# Patient Record
Sex: FEMALE | Race: WHITE | NOT HISPANIC OR LATINO | URBAN - NONMETROPOLITAN AREA
[De-identification: names, ages, dates, MRNs, and addresses within clinical notes are randomized per-mention and may not be internally consistent; named-entity substitution may affect disease eponyms.]

---

## 2017-11-13 ENCOUNTER — OFFICE VISIT (OUTPATIENT)
Dept: MEDICAL GROUP | Facility: PHYSICIAN GROUP | Age: 62
End: 2017-11-13
Payer: COMMERCIAL

## 2017-11-13 VITALS
DIASTOLIC BLOOD PRESSURE: 82 MMHG | HEART RATE: 89 BPM | RESPIRATION RATE: 16 BRPM | TEMPERATURE: 97.6 F | HEIGHT: 61 IN | WEIGHT: 160.7 LBS | SYSTOLIC BLOOD PRESSURE: 148 MMHG | BODY MASS INDEX: 30.34 KG/M2 | OXYGEN SATURATION: 95 %

## 2017-11-13 DIAGNOSIS — E78.2 MIXED HYPERLIPIDEMIA: ICD-10-CM

## 2017-11-13 DIAGNOSIS — E11.9 TYPE 2 DIABETES MELLITUS WITHOUT COMPLICATION, WITH LONG-TERM CURRENT USE OF INSULIN (HCC): ICD-10-CM

## 2017-11-13 DIAGNOSIS — I10 ESSENTIAL HYPERTENSION: ICD-10-CM

## 2017-11-13 DIAGNOSIS — E66.9 OBESITY (BMI 30-39.9): ICD-10-CM

## 2017-11-13 DIAGNOSIS — Z79.4 TYPE 2 DIABETES MELLITUS WITHOUT COMPLICATION, WITH LONG-TERM CURRENT USE OF INSULIN (HCC): ICD-10-CM

## 2017-11-13 PROCEDURE — 99204 OFFICE O/P NEW MOD 45 MIN: CPT | Performed by: NURSE PRACTITIONER

## 2017-11-13 RX ORDER — PRAVASTATIN SODIUM 80 MG/1
80 TABLET ORAL NIGHTLY
COMMUNITY
End: 2017-11-13 | Stop reason: SDUPTHER

## 2017-11-13 RX ORDER — IBUPROFEN 200 MG
TABLET ORAL
COMMUNITY
End: 2018-02-20 | Stop reason: SDUPTHER

## 2017-11-13 RX ORDER — PRAVASTATIN SODIUM 80 MG/1
80 TABLET ORAL DAILY
Qty: 90 TAB | Refills: 0 | Status: SHIPPED | OUTPATIENT
Start: 2017-11-13 | End: 2017-12-11 | Stop reason: SDUPTHER

## 2017-11-13 RX ORDER — ENALAPRIL MALEATE 10 MG/1
30 TABLET ORAL DAILY
Qty: 270 TAB | Refills: 0 | Status: SHIPPED | OUTPATIENT
Start: 2017-11-13 | End: 2017-12-11

## 2017-11-13 RX ORDER — ENALAPRIL MALEATE 20 MG/1
20 TABLET ORAL DAILY
COMMUNITY
End: 2017-11-13

## 2017-11-13 ASSESSMENT — PATIENT HEALTH QUESTIONNAIRE - PHQ9: CLINICAL INTERPRETATION OF PHQ2 SCORE: 0

## 2017-11-13 NOTE — PROGRESS NOTES
[facility]  Primary Care Office Visit - Problem-Oriented        History:     Magda Poole is a 62 y.o. female who is here today to discuss Diabetes      Type 2 diabetes mellitus without complication, with long-term current use of insulin (CMS-HCC)  Patient is 62-year-old female who is new to me and here to establish care. She does have type 2 diabetes mellitus diagnosed nearly 10 years ago. She continues on Lantus 10 units nightly, her fasting morning glucoses consistently in the 200s. She is also on metformin 1000 mg twice a day. She does admit to poor diet. She denies hypoglycemic episodes. She denies any regular exercise. She denies neuropathy or nephropathy. She is on ARB, blood pressure is uncontrolled. She is also on a statin. She is due for labs.     Essential hypertension  Patient is a 62-year-old female with hypertension and T2DM, she continues on enalapril 20 mg daily. She does not monitor at home. Her blood pressure is uncontrolled, repeat check in the office is 148/82. She denies chest pain, shortness of breath, change in vision, headaches. We will increase her enalapril to 30 g daily and asked that she follows up in 2 weeks for blood pressure check and labs. She'll follow-up with me in one month.     Mixed hyperlipidemia  Patient is a 62-year-old female who is new to me and here to establish care. She does have type 2 diabetes, hyperlipidemia, hypertension. She continues on pravastatin 80 mg daily. She admits to poor diet. She is due for labs.         Past Medical History:   Diagnosis Date   • Diabetes (CMS-HCC)    • Hyperlipidemia    • Hypertension      Past Surgical History:   Procedure Laterality Date   • ABDOMINAL HYSTERECTOMY TOTAL  2004    benign causes      Social History     Social History   • Marital status: Unknown     Spouse name: N/A   • Number of children: N/A   • Years of education: N/A     Occupational History   • Not on file.     Social History Main Topics   • Smoking  "status: Never Smoker   • Smokeless tobacco: Never Used   • Alcohol use No   • Drug use: No   • Sexual activity: Yes     Partners: Male      Comment:       Other Topics Concern   • Not on file     Social History Narrative   • No narrative on file     History   Smoking Status   • Never Smoker   Smokeless Tobacco   • Never Used     Family History   Problem Relation Age of Onset   • Diabetes Mother    • Heart Disease Father 80     No Known Allergies    Problem List:     Patient Active Problem List    Diagnosis Date Noted   • Obesity (BMI 30-39.9) 11/13/2017   • Type 2 diabetes mellitus without complication, with long-term current use of insulin (CMS-HCC) 11/13/2017   • Essential hypertension 11/13/2017   • Mixed hyperlipidemia 11/13/2017         Medications:     Current Outpatient Prescriptions:   •  INSULIN SYRINGE .5CC/29G (RELION INSULIN SYR 0.5CC/29G) 29G X 1/2\" 0.5 ML Misc, by Does not apply route., Disp: , Rfl:   •  insulin glargine (LANTUS) 100 UNIT/ML Solution Pen-injector injection, 10 units HS, increase nightly by 1 unit until fasting morning glucose is 130, Disp: 5 PEN, Rfl: 11  •  metformin (GLUCOPHAGE) 1000 MG tablet, Take 1 Tab by mouth 2 times a day, with meals., Disp: 180 Tab, Rfl: 0  •  pravastatin (PRAVACHOL) 80 MG tablet, Take 1 Tab by mouth every day., Disp: 90 Tab, Rfl: 0  •  enalapril (VASOTEC) 10 MG Tab, Take 3 Tabs by mouth every day., Disp: 270 Tab, Rfl: 0      Review of Systems:     Pertinent positives as per HPI, all other systems reviewed and WNL    Physical Assessment:     VS: /82   Pulse 89   Temp 36.4 °C (97.6 °F)   Resp 16   Ht 1.549 m (5' 1\")   Wt 72.9 kg (160 lb 11.2 oz)   SpO2 95%   Breastfeeding? No   BMI 30.36 kg/m²     General: Well-developed, well-nourished, female     Head: PERRL, EOMI. Normocephalic. No facial asymmetry noted.  Cardiovasc: RRR, no MRG. No thrills or bruits. Pulses 2+ and symmetric at all distal extremities.  Pulmonary: Lungs clear " bilaterally.  Normal respiratory effort. No wheeze or crackles.   Neuro: Alert and oriented, CNs II-XII intact, no focal deficits.  Skin:No rashes noted. Skin warm, dry, intact    Psych: Dressed appropriately for the weather, pleasant and conversant.  Affect, mood & judgment appropriate.    Assessment/Plan:   Magda was seen today for diabetes.    Diagnoses and all orders for this visit:    Obesity (BMI 30-39.9), uncontrolled   -     Patient identified as having weight management issue.  Appropriate orders and counseling given.    Type 2 diabetes mellitus without complication, with long-term current use of insulin (CMS-[facility]), unclear control   - Maximize Lantus by increasing by one unit nightly until fasting glucose is 130. Continue metformin as previously prescribed. On ARB + statin. Labs as ordered below, follow-up in 3 months.   -     HEMOGLOBIN A1C; Future  -     insulin glargine (LANTUS) 100 UNIT/ML Solution Pen-injector injection; 10 units HS, increase nightly by 1 unit until fasting morning glucose is 130  -     metformin (GLUCOPHAGE) 1000 MG tablet; Take 1 Tab by mouth 2 times a day, with meals.    Essential hypertension, uncontrolled   Increase enalapril to 30 g daily, blood pressure check in 2 weeks, follow-up for office visit in one month.   -     COMP METABOLIC PANEL; Future  -     enalapril (VASOTEC) 10 MG Tab; Take 3 Tabs by mouth every day.    Mixed hyperlipidemia, unclear control, due for labs  -     LIPID PROFILE; Future  -     pravastatin (PRAVACHOL) 80 MG tablet; Take 1 Tab by mouth every day.      Follow up in 1 month to review labs     Patient is agreeable to the above plan and voiced understanding. All questions answered.     Please note that this dictation was created using voice recognition software. I have made every reasonable attempt to correct obvious errors, but I expect that there are errors of grammar and possibly content that I did not discover before finalizing the note.      [place]  MELIA Easton  11/13/2017, 3:24 PM

## 2017-11-13 NOTE — ASSESSMENT & PLAN NOTE
Patient is a 62-year-old female with hypertension and T2DM, she continues on enalapril 20 mg daily. She does not monitor at home. Her blood pressure is uncontrolled, repeat check in the office is 148/82. She denies chest pain, shortness of breath, change in vision, headaches. We will increase her enalapril to 30 g daily and asked that she follows up in 2 weeks for blood pressure check and labs. She'll follow-up with me in one month.

## 2017-11-13 NOTE — ASSESSMENT & PLAN NOTE
Patient is 62-year-old female who is new to me and here to establish care. She does have type 2 diabetes mellitus diagnosed nearly 10 years ago. She continues on Lantus 10 units nightly, her fasting morning glucoses consistently in the 200s. She is also on metformin 1000 mg twice a day. She does admit to poor diet. She denies hypoglycemic episodes. She denies any regular exercise. She denies neuropathy or nephropathy. She is on ARB, blood pressure is uncontrolled. She is also on a statin. She is due for labs.

## 2017-11-13 NOTE — ASSESSMENT & PLAN NOTE
Patient is a 62-year-old female who is new to me and here to establish care. She does have type 2 diabetes, hyperlipidemia, hypertension. She continues on pravastatin 80 mg daily. She admits to poor diet. She is due for labs.

## 2017-11-20 ENCOUNTER — HOSPITAL ENCOUNTER (OUTPATIENT)
Dept: LAB | Facility: MEDICAL CENTER | Age: 62
End: 2017-11-20
Attending: NURSE PRACTITIONER
Payer: COMMERCIAL

## 2017-11-20 ENCOUNTER — NON-PROVIDER VISIT (OUTPATIENT)
Dept: MEDICAL GROUP | Facility: PHYSICIAN GROUP | Age: 62
End: 2017-11-20
Payer: COMMERCIAL

## 2017-11-20 VITALS — SYSTOLIC BLOOD PRESSURE: 142 MMHG | DIASTOLIC BLOOD PRESSURE: 80 MMHG

## 2017-11-20 DIAGNOSIS — E11.9 TYPE 2 DIABETES MELLITUS WITHOUT COMPLICATION, WITH LONG-TERM CURRENT USE OF INSULIN (HCC): ICD-10-CM

## 2017-11-20 DIAGNOSIS — Z79.4 TYPE 2 DIABETES MELLITUS WITHOUT COMPLICATION, WITH LONG-TERM CURRENT USE OF INSULIN (HCC): ICD-10-CM

## 2017-11-20 DIAGNOSIS — E78.2 MIXED HYPERLIPIDEMIA: ICD-10-CM

## 2017-11-20 DIAGNOSIS — I10 ESSENTIAL HYPERTENSION: ICD-10-CM

## 2017-11-20 LAB
ALBUMIN SERPL BCP-MCNC: 3.3 G/DL (ref 3.2–4.9)
ALBUMIN/GLOB SERPL: 0.8 G/DL
ALP SERPL-CCNC: 56 U/L (ref 30–99)
ALT SERPL-CCNC: 24 U/L (ref 2–50)
ANION GAP SERPL CALC-SCNC: 11 MMOL/L (ref 0–11.9)
AST SERPL-CCNC: 30 U/L (ref 12–45)
BILIRUB SERPL-MCNC: 0.8 MG/DL (ref 0.1–1.5)
BUN SERPL-MCNC: 12 MG/DL (ref 8–22)
CALCIUM SERPL-MCNC: 8.5 MG/DL (ref 8.5–10.5)
CHLORIDE SERPL-SCNC: 96 MMOL/L (ref 96–112)
CHOLEST SERPL-MCNC: 154 MG/DL (ref 100–199)
CO2 SERPL-SCNC: 18 MMOL/L (ref 20–33)
CREAT SERPL-MCNC: 0.61 MG/DL (ref 0.5–1.4)
EST. AVERAGE GLUCOSE BLD GHB EST-MCNC: 223 MG/DL
GFR SERPL CREATININE-BSD FRML MDRD: >60 ML/MIN/1.73 M 2
GLOBULIN SER CALC-MCNC: 4 G/DL (ref 1.9–3.5)
GLUCOSE SERPL-MCNC: 163 MG/DL (ref 65–99)
HBA1C MFR BLD: 9.4 % (ref 0–5.6)
HDLC SERPL-MCNC: 30 MG/DL
LDLC SERPL CALC-MCNC: 95 MG/DL
POTASSIUM SERPL-SCNC: 4.1 MMOL/L (ref 3.6–5.5)
PROT SERPL-MCNC: 7.3 G/DL (ref 6–8.2)
SODIUM SERPL-SCNC: 125 MMOL/L (ref 135–145)
TRIGL SERPL-MCNC: 147 MG/DL (ref 0–149)

## 2017-11-20 PROCEDURE — 83036 HEMOGLOBIN GLYCOSYLATED A1C: CPT

## 2017-11-20 PROCEDURE — 36415 COLL VENOUS BLD VENIPUNCTURE: CPT

## 2017-11-20 PROCEDURE — 80061 LIPID PANEL: CPT

## 2017-11-20 PROCEDURE — 80053 COMPREHEN METABOLIC PANEL: CPT

## 2017-11-20 NOTE — PROGRESS NOTES
Magda Poole is a 62 y.o. female here for a non-provider visit for Blood Pressure Check.    Vitals:    11/20/17 0804   BP: 142/80     If abnormal, was an in office provider notified today? Yes  Routed to PCP? Yes

## 2017-11-21 ENCOUNTER — TELEPHONE (OUTPATIENT)
Dept: MEDICAL GROUP | Facility: PHYSICIAN GROUP | Age: 62
End: 2017-11-21

## 2017-11-21 DIAGNOSIS — E87.1 HYPONATREMIA: ICD-10-CM

## 2017-11-21 DIAGNOSIS — E11.9 TYPE 2 DIABETES MELLITUS WITHOUT COMPLICATION, WITH LONG-TERM CURRENT USE OF INSULIN (HCC): ICD-10-CM

## 2017-11-21 DIAGNOSIS — Z79.4 TYPE 2 DIABETES MELLITUS WITHOUT COMPLICATION, WITH LONG-TERM CURRENT USE OF INSULIN (HCC): ICD-10-CM

## 2017-11-21 NOTE — TELEPHONE ENCOUNTER
Provider notes - Reviewed labs, called patient to discuss hyponatremia, we will move forward with additional laboratory evaluation. Patient is feeling well, she denies polyphasia, increased urination, swelling of the legs, diarrhea. She does admit that she has not been eating much, she doesn't drink more than 3 cups of coffee in the morning and 3 bottles of water throughout the day. She has uncontrolled diabetes, she is on metformin but she does deny diarrhea.     MA's please call patient - she is to eat salty foods, limit coffee, moderate water consumption, repeat labs in about 2 weeks. Start taking new diabetes medication with meformin in the morning, her diabetes may be causing her low sodium.    Follow up in 3 weeks to review labs, bring glucose readings to appointment.

## 2017-11-22 ENCOUNTER — TELEPHONE (OUTPATIENT)
Dept: MEDICAL GROUP | Facility: PHYSICIAN GROUP | Age: 62
End: 2017-11-22

## 2017-11-22 DIAGNOSIS — Z79.4 TYPE 2 DIABETES MELLITUS WITHOUT COMPLICATION, WITH LONG-TERM CURRENT USE OF INSULIN (HCC): ICD-10-CM

## 2017-11-22 DIAGNOSIS — E11.9 TYPE 2 DIABETES MELLITUS WITHOUT COMPLICATION, WITH LONG-TERM CURRENT USE OF INSULIN (HCC): ICD-10-CM

## 2017-11-22 RX ORDER — GLIPIZIDE 10 MG/1
10 TABLET ORAL 2 TIMES DAILY
Qty: 60 TAB | Refills: 2 | Status: SHIPPED | OUTPATIENT
Start: 2017-11-22 | End: 2017-12-11

## 2017-11-22 NOTE — TELEPHONE ENCOUNTER
Start glipizide 10mg with breakfast, must be taken with food as it can drop glucose.     Take once a day for a week, then increase to twice a day with meals

## 2017-11-22 NOTE — TELEPHONE ENCOUNTER
Pharmacy faxed over and stated that Magda cannot afford $323.43 for a 30 day supply of the Invokana. Is there other medication that she can take. Please advise?

## 2017-11-29 ENCOUNTER — TELEPHONE (OUTPATIENT)
Dept: MEDICAL GROUP | Facility: PHYSICIAN GROUP | Age: 62
End: 2017-11-29

## 2017-11-29 NOTE — TELEPHONE ENCOUNTER
1. Caller Name: Magda                      Call Back Number: [phone] (home)       2. Message: Patient called and stated that since she starting taking the Glipizide on Saturday she has been very itchy. She is still taking the medication. Please advise?    3. Patient approves office to leave a detailed voicemail/MyChart message: N\A

## 2017-11-29 NOTE — TELEPHONE ENCOUNTER
This can be a side effect of the medication, stop x 1 week if sx resolve this is like due to the medication and we can try an alternate. She needs to call us next week and let us know.

## 2017-12-06 ENCOUNTER — HOSPITAL ENCOUNTER (OUTPATIENT)
Dept: LAB | Facility: MEDICAL CENTER | Age: 62
End: 2017-12-06
Attending: NURSE PRACTITIONER
Payer: COMMERCIAL

## 2017-12-06 DIAGNOSIS — E87.1 HYPONATREMIA: ICD-10-CM

## 2017-12-06 LAB
ANION GAP SERPL CALC-SCNC: 10 MMOL/L (ref 0–11.9)
BUN SERPL-MCNC: 10 MG/DL (ref 8–22)
CALCIUM SERPL-MCNC: 9.6 MG/DL (ref 8.5–10.5)
CHLORIDE SERPL-SCNC: 106 MMOL/L (ref 96–112)
CO2 SERPL-SCNC: 22 MMOL/L (ref 20–33)
CORTIS SERPL-MCNC: 19.6 UG/DL (ref 0–23)
CREAT SERPL-MCNC: 0.68 MG/DL (ref 0.5–1.4)
GFR SERPL CREATININE-BSD FRML MDRD: >60 ML/MIN/1.73 M 2
GLUCOSE SERPL-MCNC: 141 MG/DL (ref 65–99)
POTASSIUM SERPL-SCNC: 4.2 MMOL/L (ref 3.6–5.5)
SODIUM SERPL-SCNC: 138 MMOL/L (ref 135–145)
T4 FREE SERPL-MCNC: 0.75 NG/DL (ref 0.53–1.43)
TSH SERPL DL<=0.005 MIU/L-ACNC: 3.91 UIU/ML (ref 0.3–3.7)

## 2017-12-06 PROCEDURE — 82533 TOTAL CORTISOL: CPT

## 2017-12-06 PROCEDURE — 84439 ASSAY OF FREE THYROXINE: CPT

## 2017-12-06 PROCEDURE — 36415 COLL VENOUS BLD VENIPUNCTURE: CPT

## 2017-12-06 PROCEDURE — 84443 ASSAY THYROID STIM HORMONE: CPT

## 2017-12-06 PROCEDURE — 80048 BASIC METABOLIC PNL TOTAL CA: CPT

## 2017-12-06 PROCEDURE — 82024 ASSAY OF ACTH: CPT

## 2017-12-08 LAB — ACTH PLAS-MCNC: 30 PG/ML (ref 6–58)

## 2017-12-11 ENCOUNTER — OFFICE VISIT (OUTPATIENT)
Dept: MEDICAL GROUP | Facility: PHYSICIAN GROUP | Age: 62
End: 2017-12-11
Payer: COMMERCIAL

## 2017-12-11 VITALS
DIASTOLIC BLOOD PRESSURE: 78 MMHG | HEIGHT: 61 IN | TEMPERATURE: 98.2 F | OXYGEN SATURATION: 95 % | BODY MASS INDEX: 27.94 KG/M2 | WEIGHT: 148 LBS | SYSTOLIC BLOOD PRESSURE: 150 MMHG | HEART RATE: 90 BPM | RESPIRATION RATE: 16 BRPM

## 2017-12-11 DIAGNOSIS — Z79.4 TYPE 2 DIABETES MELLITUS WITHOUT COMPLICATION, WITH LONG-TERM CURRENT USE OF INSULIN (HCC): ICD-10-CM

## 2017-12-11 DIAGNOSIS — E78.2 MIXED HYPERLIPIDEMIA: ICD-10-CM

## 2017-12-11 DIAGNOSIS — E11.9 TYPE 2 DIABETES MELLITUS WITHOUT COMPLICATION, WITH LONG-TERM CURRENT USE OF INSULIN (HCC): ICD-10-CM

## 2017-12-11 DIAGNOSIS — Z00.00 HEALTH CARE MAINTENANCE: ICD-10-CM

## 2017-12-11 DIAGNOSIS — I10 ESSENTIAL HYPERTENSION: ICD-10-CM

## 2017-12-11 DIAGNOSIS — Z12.31 ENCOUNTER FOR SCREENING MAMMOGRAM FOR BREAST CANCER: ICD-10-CM

## 2017-12-11 DIAGNOSIS — Z23 NEED FOR VACCINATION: ICD-10-CM

## 2017-12-11 DIAGNOSIS — E87.1 HYPONATREMIA: ICD-10-CM

## 2017-12-11 DIAGNOSIS — R79.89 ELEVATED TSH: ICD-10-CM

## 2017-12-11 PROCEDURE — 90715 TDAP VACCINE 7 YRS/> IM: CPT | Performed by: NURSE PRACTITIONER

## 2017-12-11 PROCEDURE — 90472 IMMUNIZATION ADMIN EACH ADD: CPT | Performed by: NURSE PRACTITIONER

## 2017-12-11 PROCEDURE — 99214 OFFICE O/P EST MOD 30 MIN: CPT | Mod: 25 | Performed by: NURSE PRACTITIONER

## 2017-12-11 PROCEDURE — 90686 IIV4 VACC NO PRSV 0.5 ML IM: CPT | Performed by: NURSE PRACTITIONER

## 2017-12-11 PROCEDURE — 90471 IMMUNIZATION ADMIN: CPT | Performed by: NURSE PRACTITIONER

## 2017-12-11 RX ORDER — ENALAPRIL MALEATE 20 MG/1
40 TABLET ORAL DAILY
Qty: 180 TAB | Refills: 0 | Status: SHIPPED | OUTPATIENT
Start: 2017-12-11 | End: 2018-03-12 | Stop reason: SDUPTHER

## 2017-12-11 RX ORDER — AMLODIPINE BESYLATE 5 MG/1
5 TABLET ORAL DAILY
Qty: 90 TAB | Refills: 0 | Status: SHIPPED | OUTPATIENT
Start: 2017-12-11 | End: 2018-03-12 | Stop reason: SDUPTHER

## 2017-12-11 RX ORDER — PRAVASTATIN SODIUM 80 MG/1
80 TABLET ORAL DAILY
Qty: 90 TAB | Refills: 0 | Status: SHIPPED | OUTPATIENT
Start: 2017-12-11 | End: 2018-03-12 | Stop reason: SDUPTHER

## 2017-12-11 NOTE — ASSESSMENT & PLAN NOTE
Colonoscopy March 2017, adenomatous polyp, recall 5 years, I will attempt to obtain these records.  Due for mammogram, ordered today  Vaccines today as ordered.  Recommend Pap smear

## 2017-12-11 NOTE — ASSESSMENT & PLAN NOTE
Very mild elevation in TSH, patient denies any symptoms of hypothyroidism, we will continue to monitor this annually.

## 2017-12-11 NOTE — ASSESSMENT & PLAN NOTE
Patient is a 62-year-old female with type 2 diabetes here for follow-up. Her most recent A1c is elevated at 9.4%. She does continue on Lantus which she has recently titrated up to 15 units nightly, fasting glucose readings are trending down nicely, averaging about 135. She continues on metformin 1000 mg twice daily. Unfortunately she was unable to tolerate glipizide due to itching. Invokana was too costly. We will start her on Januvia 100 mg daily and asked that she follow up in 3 months with repeat A1c. She is on ARB, statin. She is due for retinal screening and monofilament.

## 2017-12-11 NOTE — ASSESSMENT & PLAN NOTE
Patient is a 62-year-old female with type 2 diabetes and hypertension, she is here for follow-up. Her blood pressure remains uncontrolled on enalapril 30 mg daily. We will maximize this to 40 mg daily and add amlodipine 5 mg daily, she'll follow up in 2 weeks for blood pressure check. She denies chest pain, shortness of breath, change of vision, headaches.

## 2017-12-11 NOTE — ASSESSMENT & PLAN NOTE
Patient is a 62-year-old female with type 2 diabetes and hyperlipidemia, she discontinued statin, we did repeat labs today, total cholesterol and LDL are well controlled on pravastatin 80 mg daily. We will monitor this every 6-12 months.

## 2017-12-11 NOTE — PROGRESS NOTES
[facility]  Primary Care Office Visit - Problem-Oriented        History:     Magda Poole is a 62 y.o. female who is here today to discuss Hypertension      Essential hypertension  Patient is a 62-year-old female with type 2 diabetes and hypertension, she is here for follow-up. Her blood pressure remains uncontrolled on enalapril 30 mg daily. We will maximize this to 40 mg daily and add amlodipine 5 mg daily, she'll follow up in 2 weeks for blood pressure check. She denies chest pain, shortness of breath, change of vision, headaches.    Type 2 diabetes mellitus without complication, with long-term current use of insulin (CMS-HCC)  Patient is a 62-year-old female with type 2 diabetes here for follow-up. Her most recent A1c is elevated at 9.4%. She does continue on Lantus which she has recently titrated up to 15 units nightly, fasting glucose readings are trending down nicely, averaging about 135. She continues on metformin 1000 mg twice daily. Unfortunately she was unable to tolerate glipizide due to itching. Invokana was too costly. We will start her on Januvia 100 mg daily and asked that she follow up in 3 months with repeat A1c. She is on ARB, statin. She is due for retinal screening and monofilament.             Health care maintenance  Colonoscopy March 2017, adenomatous polyp, recall 5 years, I will attempt to obtain these records.  Due for mammogram, ordered today  Vaccines today as ordered.  Recommend Pap smear         Mixed hyperlipidemia  Patient is a 62-year-old female with type 2 diabetes and hyperlipidemia, she discontinued statin, we did repeat labs today, total cholesterol and LDL are well controlled on pravastatin 80 mg daily. We will monitor this every 6-12 months.    Elevated TSH  Very mild elevation in TSH, patient denies any symptoms of hypothyroidism, we will continue to monitor this annually.        Past Medical History:   Diagnosis Date   • Diabetes (CMS-HCC)    •  "Hyperlipidemia    • Hypertension      Past Surgical History:   Procedure Laterality Date   • ABDOMINAL HYSTERECTOMY TOTAL  2004    benign causes      Social History     Social History   • Marital status: Unknown     Spouse name: N/A   • Number of children: N/A   • Years of education: N/A     Occupational History   • Not on file.     Social History Main Topics   • Smoking status: Never Smoker   • Smokeless tobacco: Never Used   • Alcohol use No   • Drug use: No   • Sexual activity: Yes     Partners: Male      Comment:       Other Topics Concern   • Not on file     Social History Narrative   • No narrative on file     History   Smoking Status   • Never Smoker   Smokeless Tobacco   • Never Used     Family History   Problem Relation Age of Onset   • Diabetes Mother    • Heart Disease Father 80     Allergies   Allergen Reactions   • Glipizide Itching       Problem List:     Patient Active Problem List    Diagnosis Date Noted   • Health care maintenance 12/11/2017   • Obesity (BMI 30-39.9) 11/13/2017   • Type 2 diabetes mellitus without complication, with long-term current use of insulin (CMS-HCC) 11/13/2017   • Essential hypertension 11/13/2017   • Mixed hyperlipidemia 11/13/2017         Medications:     Current Outpatient Prescriptions:   •  sitagliptin (JANUVIA) 100 MG Tab, Take 1 Tab by mouth every day., Disp: 90 Tab, Rfl: 0  •  metformin (GLUCOPHAGE) 1000 MG tablet, Take 1 Tab by mouth 2 times a day, with meals., Disp: 180 Tab, Rfl: 0  •  pravastatin (PRAVACHOL) 80 MG tablet, Take 1 Tab by mouth every day., Disp: 90 Tab, Rfl: 0  •  enalapril (VASOTEC) 20 MG tablet, Take 2 Tabs by mouth every day., Disp: 180 Tab, Rfl: 0  •  amlodipine (NORVASC) 5 MG Tab, Take 1 Tab by mouth every day., Disp: 90 Tab, Rfl: 0  •  INSULIN SYRINGE .5CC/29G (RELION INSULIN SYR 0.5CC/29G) 29G X 1/2\" 0.5 ML Misc, by Does not apply route., Disp: , Rfl:   •  insulin glargine (LANTUS) 100 UNIT/ML Solution Pen-injector injection, 10 units " "HS, increase nightly by 1 unit until fasting morning glucose is 130, Disp: 5 PEN, Rfl: 11      Review of Systems:     Pertinent positives as per HPI, all other systems reviewed and WNL     Physical Assessment:     VS: /78   Pulse 90   Temp 36.8 °C (98.2 °F)   Resp 16   Ht 1.549 m (5' 1\")   Wt 67.1 kg (148 lb)   SpO2 95%   BMI 27.96 kg/m²     General: Well-developed, well-nourished, female     Head: PERRL, EOMI. Normocephalic. No facial asymmetry noted.  Cardiovasc:No JVD.  RRR, no MRG. No thrills or bruits. Pulses 2+ and symmetric at all distal extremities.  Pulmonary: Lungs clear bilaterally.  Normal respiratory effort. No wheeze or crackles.   Neuro: Alert and oriented, CNs II-XII intact, no focal deficits.  Skin:No rashes noted. Skin warm, dry, intact    Psych: Dressed appropriately for the weather, pleasant and conversant.  Affect, mood & judgment appropriate.      Assessment/Plan:   Magda was seen today for hypertension.    Diagnoses and all orders for this visit:    Type 2 diabetes mellitus without complication, with long-term current use of insulin (CMS-HCC), chronic, uncontrolled  - Continue titrating Lantus until morning fasting glucose is 120-130, continue metformin, start Januvia. Continue ARB and statin. Reinforced diet and exercise. Follow up in 3 months, labs prior to office visit.   -     sitagliptin (JANUVIA) 100 MG Tab; Take 1 Tab by mouth every day.  -     metformin (GLUCOPHAGE) 1000 MG tablet; Take 1 Tab by mouth 2 times a day, with meals.  -     HEMOGLOBIN A1C; Future  -     MICROALBUMIN CREAT RATIO URINE; Future  -     REFERRAL FOR RETINAL SCREENING EXAM    Essential hypertension, chronic, uncontrolled  - Maximize enalapril, start amlodipine, follow-up in 2 weeks for blood pressure check, follow-up in one month for office visit.  -     enalapril (VASOTEC) 20 MG tablet; Take 2 Tabs by mouth every day.  -     amlodipine (NORVASC) 5 MG Tab; Take 1 Tab by mouth every day.    Mixed " hyperlipidemia, chronic, stable on present treatment  -     pravastatin (PRAVACHOL) 80 MG tablet; Take 1 Tab by mouth every day.    Hyponatremia, resolved, continue to monitor    Health care maintenance    Encounter for screening mammogram for breast cancer  -     MA-MAMMO SCREENING BILAT W/ANNE W/CAD; Future    Need for vaccination  -     TDAP VACCINE =>6YO IM  -     INFLUENZA VACCINE QUAD INJ >3Y(PF)    Elevated TSH, new   Reassurance, asymptomatic, continue to monitor       Patient is agreeable to the above plan and voiced understanding. All questions answered.     Please note that this dictation was created using voice recognition software. I have made every reasonable attempt to correct obvious errors, but I expect that there are errors of grammar and possibly content that I did not discover before finalizing the note.      MELIA Solano  12/11/2017, 10:01 AM

## 2018-02-12 ENCOUNTER — TELEPHONE (OUTPATIENT)
Dept: MEDICAL GROUP | Facility: PHYSICIAN GROUP | Age: 63
End: 2018-02-12

## 2018-02-12 DIAGNOSIS — Z79.4 TYPE 2 DIABETES MELLITUS WITHOUT COMPLICATION, WITH LONG-TERM CURRENT USE OF INSULIN (HCC): ICD-10-CM

## 2018-02-12 DIAGNOSIS — E11.9 TYPE 2 DIABETES MELLITUS WITHOUT COMPLICATION, WITH LONG-TERM CURRENT USE OF INSULIN (HCC): ICD-10-CM

## 2018-02-20 RX ORDER — IBUPROFEN 200 MG
TABLET ORAL
Qty: 30 EACH | Refills: 5 | Status: SHIPPED | OUTPATIENT
Start: 2018-02-20

## 2018-03-05 ENCOUNTER — HOSPITAL ENCOUNTER (OUTPATIENT)
Dept: LAB | Facility: MEDICAL CENTER | Age: 63
End: 2018-03-05
Attending: NURSE PRACTITIONER
Payer: COMMERCIAL

## 2018-03-05 DIAGNOSIS — Z79.4 TYPE 2 DIABETES MELLITUS WITHOUT COMPLICATION, WITH LONG-TERM CURRENT USE OF INSULIN (HCC): ICD-10-CM

## 2018-03-05 DIAGNOSIS — E11.9 TYPE 2 DIABETES MELLITUS WITHOUT COMPLICATION, WITH LONG-TERM CURRENT USE OF INSULIN (HCC): ICD-10-CM

## 2018-03-05 LAB
CREAT UR-MCNC: 80 MG/DL
EST. AVERAGE GLUCOSE BLD GHB EST-MCNC: 166 MG/DL
HBA1C MFR BLD: 7.4 % (ref 0–5.6)
MICROALBUMIN UR-MCNC: <0.7 MG/DL
MICROALBUMIN/CREAT UR: NORMAL MG/G (ref 0–30)

## 2018-03-05 PROCEDURE — 82570 ASSAY OF URINE CREATININE: CPT

## 2018-03-05 PROCEDURE — 83036 HEMOGLOBIN GLYCOSYLATED A1C: CPT

## 2018-03-05 PROCEDURE — 36415 COLL VENOUS BLD VENIPUNCTURE: CPT

## 2018-03-05 PROCEDURE — 82043 UR ALBUMIN QUANTITATIVE: CPT

## 2018-03-12 ENCOUNTER — OFFICE VISIT (OUTPATIENT)
Dept: MEDICAL GROUP | Facility: PHYSICIAN GROUP | Age: 63
End: 2018-03-12
Payer: COMMERCIAL

## 2018-03-12 VITALS
TEMPERATURE: 98.3 F | BODY MASS INDEX: 28.92 KG/M2 | RESPIRATION RATE: 16 BRPM | HEIGHT: 61 IN | DIASTOLIC BLOOD PRESSURE: 60 MMHG | WEIGHT: 153.2 LBS | OXYGEN SATURATION: 95 % | HEART RATE: 98 BPM | SYSTOLIC BLOOD PRESSURE: 140 MMHG

## 2018-03-12 DIAGNOSIS — Z79.4 TYPE 2 DIABETES MELLITUS WITHOUT COMPLICATION, WITH LONG-TERM CURRENT USE OF INSULIN (HCC): ICD-10-CM

## 2018-03-12 DIAGNOSIS — E11.9 TYPE 2 DIABETES MELLITUS WITHOUT COMPLICATION, WITH LONG-TERM CURRENT USE OF INSULIN (HCC): ICD-10-CM

## 2018-03-12 DIAGNOSIS — I10 ESSENTIAL HYPERTENSION: ICD-10-CM

## 2018-03-12 DIAGNOSIS — E78.2 MIXED HYPERLIPIDEMIA: ICD-10-CM

## 2018-03-12 PROCEDURE — 92250 FUNDUS PHOTOGRAPHY W/I&R: CPT | Mod: TC | Performed by: NURSE PRACTITIONER

## 2018-03-12 PROCEDURE — 99214 OFFICE O/P EST MOD 30 MIN: CPT | Mod: 25 | Performed by: NURSE PRACTITIONER

## 2018-03-12 RX ORDER — ENALAPRIL MALEATE 20 MG/1
40 TABLET ORAL DAILY
Qty: 180 TAB | Refills: 1 | Status: SHIPPED | OUTPATIENT
Start: 2018-03-12 | End: 2018-09-17 | Stop reason: SDUPTHER

## 2018-03-12 RX ORDER — AMLODIPINE BESYLATE 5 MG/1
5 TABLET ORAL DAILY
Qty: 90 TAB | Refills: 1 | Status: SHIPPED | OUTPATIENT
Start: 2018-03-12 | End: 2018-09-17 | Stop reason: SDUPTHER

## 2018-03-12 RX ORDER — PRAVASTATIN SODIUM 80 MG/1
80 TABLET ORAL DAILY
Qty: 90 TAB | Refills: 1 | Status: SHIPPED | OUTPATIENT
Start: 2018-03-12 | End: 2018-09-17 | Stop reason: SDUPTHER

## 2018-03-12 NOTE — ASSESSMENT & PLAN NOTE
This is a chronic condition which is well controlled on medications. Patient is tolerating medications without side effects. Monitor labs in 6 months at follow up.

## 2018-03-12 NOTE — ASSESSMENT & PLAN NOTE
Patient is a 62-year-old female with type 2 diabetes here for follow-up. Her most recent A1c has decreased from 9.4- to 7.4% with titration of her Lantus alone. She is currently on 19 units of Lantus nightly, fasting morning glucose readings remain elevated around 140, we did discuss increasing her dose until her fasting morning readings are less than 130. She continues on metformin 1000 mg twice daily. She is also on statin and ACE inhibitor.

## 2018-03-12 NOTE — PROGRESS NOTES
[facility]  Primary Care Office Visit - Problem-Oriented        History:     Magda Poole is a 62 y.o. female who is here today to discuss Diabetes ([facility])      Type 2 diabetes mellitus without complication, with long-term current use of insulin (CMS-HCC)  Patient is a 62-year-old female with type 2 diabetes here for follow-up. Her most recent A1c has decreased from 9.4- to 7.4% with titration of her Lantus alone. She is currently on 19 units of Lantus nightly, fasting morning glucose readings remain elevated around 140, we did discuss increasing her dose until her fasting morning readings are less than 130. She continues on metformin 1000 mg twice daily. She is also on statin and ACE inhibitor.     Essential hypertension  Patient is a 62-year-old female with type 2 diabetes and hypertension. She continues on enalapril 40 mg daily and amlodipine 5 mg daily. She denies chest pain, shortness of breath continued vision, headaches. Her blood pressure is borderline today.    Mixed hyperlipidemia  This is a chronic condition which is well controlled on medications. Patient is tolerating medications without side effects. Monitor labs in 6 months at follow up.         Past Medical History:   Diagnosis Date   • Diabetes (CMS-[facility])    • Hyperlipidemia    • Hypertension      Past Surgical History:   Procedure Laterality Date   • ABDOMINAL HYSTERECTOMY TOTAL  2004    benign causes      Social History     Social History   • Marital status: Unknown     Spouse name: N/A   • Number of children: N/A   • Years of education: N/A     Occupational History   • Not on file.     Social History Main Topics   • Smoking status: Never Smoker   • Smokeless tobacco: Never Used   • Alcohol use No   • Drug use: No   • Sexual activity: Yes     Partners: Male      Comment:       Other Topics Concern   • Not on file     Social History Narrative   • No narrative on file     History   Smoking Status   • Never Smoker   Smokeless  "Tobacco   • Never Used     Family History   Problem Relation Age of Onset   • Diabetes Mother    • Heart Disease Father 80     Allergies   Allergen Reactions   • Glipizide Itching       Problem List:     Patient Active Problem List    Diagnosis Date Noted   • Health care maintenance 12/11/2017   • Elevated TSH 12/11/2017   • Obesity (BMI 30-39.9) 11/13/2017   • Type 2 diabetes mellitus without complication, with long-term current use of insulin (CMS-[facility]) 11/13/2017   • Essential hypertension 11/13/2017   • Mixed hyperlipidemia 11/13/2017         Medications:     Current Outpatient Prescriptions:   •  metformin (GLUCOPHAGE) 1000 MG tablet, Take 1 Tab by mouth 2 times a day, with meals., Disp: 180 Tab, Rfl: 1  •  pravastatin (PRAVACHOL) 80 MG tablet, Take 1 Tab by mouth every day., Disp: 90 Tab, Rfl: 1  •  enalapril (VASOTEC) 20 MG tablet, Take 2 Tabs by mouth every day., Disp: 180 Tab, Rfl: 1  •  amLODIPine (NORVASC) 5 MG Tab, Take 1 Tab by mouth every day., Disp: 90 Tab, Rfl: 1  •  insulin glargine (LANTUS) 100 UNIT/ML Solution Pen-injector injection, 20 units HS, increase nightly by 1 unit until fasting morning glucose is 130, Disp: 5 PEN, Rfl: 11  •  INSULIN SYRINGE .5CC/29G (RELION INSULIN SYR 0.5CC/29G) 29G X 1/2\" 0.5 ML Misc, Use to administer lantus insulin nightly, Disp: 30 Each, Rfl: 5      Review of Systems:     Pertinent positives as per HPI, all other systems reviewed and WNL     Physical Assessment:     VS: /60   Pulse 98   Temp 36.8 °C (98.3 °F)   Resp 16   Ht 1.549 m (5' 0.98\")   Wt 69.5 kg (153 lb 3.2 oz)   SpO2 95%   Breastfeeding? No   BMI 28.96 kg/m²     General: Well-developed, well-nourished, female     Head: PERRL, EOMI. Normocephalic. No facial asymmetry noted.  Cardiovasc:  RRR, no MRG. No thrills or bruits. Pulses 2+ and symmetric at all distal extremities.  Pulmonary: Lungs clear bilaterally.  Normal respiratory effort. No wheeze or crackles.   Neuro: Alert and " oriented  Skin:No rashes noted. Skin warm, dry, intact    Psych: Dressed appropriately for the weather, pleasant and conversant.  Affect, mood & judgment appropriate.    Monofilament testing with a 10 gram force: sensation intact: intact bilaterally  Visual Inspection: Feet without maceration, ulcers, fissures.  Pedal pulses: intact bilaterally    Recent Labs & Imaging:         Assessment/Plan:   Magda was seen today for diabetes.    Diagnoses and all orders for this visit:    Type 2 diabetes mellitus without complication, with long-term current use of insulin (CMS-[facility]), stable   - Continue to work on titrating Lantus until fasting morning glucose reading is less than or equal to 130. No other changes to treatment plan. Labs were reviewed with the patient. Follow-up in 6 months with repeat labs.    -     metformin (GLUCOPHAGE) 1000 MG tablet; Take 1 Tab by mouth 2 times a day, with meals.  -     insulin glargine (LANTUS) 100 UNIT/ML Solution Pen-injector injection; 20 units HS, increase nightly by 1 unit until fasting morning glucose is 130  -     HEMOGLOBIN A1C; Future  -     POCT Retinal Eye Exam  -     Diabetic Monofilament Lower Extremity Exam    Essential hypertension, chronic, stable on present treatment, continue to monitor  -     enalapril (VASOTEC) 20 MG tablet; Take 2 Tabs by mouth every day.  -     amLODIPine (NORVASC) 5 MG Tab; Take 1 Tab by mouth every day.  -     COMP METABOLIC PANEL; Future    Mixed hyperlipidemia, chronic, stable, follow up in 6 months with repeat labs  -     pravastatin (PRAVACHOL) 80 MG tablet; Take 1 Tab by mouth every day.  -     LIPID PROFILE; Future      Patient is agreeable to the above plan and voiced understanding. All questions answered.     Please note that this dictation was created using voice recognition software. I have made every reasonable attempt to correct obvious errors, but I expect that there are errors of grammar and possibly content that I did not discover  before finalizing the note.      MELIA Solano  3/12/2018, 8:57 AM

## 2018-03-14 LAB — RETINAL SCREEN: NEGATIVE

## 2018-07-16 ENCOUNTER — TELEPHONE (OUTPATIENT)
Dept: MEDICAL GROUP | Facility: PHYSICIAN GROUP | Age: 63
End: 2018-07-16

## 2018-07-16 RX ORDER — INSULIN GLARGINE 100 [IU]/ML
20 INJECTION, SOLUTION SUBCUTANEOUS
Qty: 5 PEN | Refills: 5 | Status: SHIPPED
Start: 2018-07-16 | End: 2019-04-09 | Stop reason: SDUPTHER

## 2018-07-16 NOTE — TELEPHONE ENCOUNTER
----- Message from Trinity Farley, Med Ass't sent at 7/16/2018  8:34 AM PDT -----  Regarding: medication  Contact: [phone]  Patients  came into the office stating that [facility] told them that the insurance company will no longer over lantus.  They let them know that the insurance company will how ever cover Basaglar .     They would like this change, as she has been out for 2 days.

## 2018-07-16 NOTE — TELEPHONE ENCOUNTER
Prescription faxed to:    [facility] [id number] - [place], [place] - [address] [address]  Phone: [phone] Fax: [phone]    Tamiko

## 2018-09-10 ENCOUNTER — HOSPITAL ENCOUNTER (OUTPATIENT)
Dept: LAB | Facility: MEDICAL CENTER | Age: 63
End: 2018-09-10
Attending: NURSE PRACTITIONER
Payer: COMMERCIAL

## 2018-09-10 DIAGNOSIS — I10 ESSENTIAL HYPERTENSION: ICD-10-CM

## 2018-09-10 DIAGNOSIS — E78.2 MIXED HYPERLIPIDEMIA: ICD-10-CM

## 2018-09-10 DIAGNOSIS — Z79.4 TYPE 2 DIABETES MELLITUS WITHOUT COMPLICATION, WITH LONG-TERM CURRENT USE OF INSULIN (HCC): ICD-10-CM

## 2018-09-10 DIAGNOSIS — E11.9 TYPE 2 DIABETES MELLITUS WITHOUT COMPLICATION, WITH LONG-TERM CURRENT USE OF INSULIN (HCC): ICD-10-CM

## 2018-09-10 LAB
ALBUMIN SERPL BCP-MCNC: 4.1 G/DL (ref 3.2–4.9)
ALBUMIN/GLOB SERPL: 1.1 G/DL
ALP SERPL-CCNC: 60 U/L (ref 30–99)
ALT SERPL-CCNC: 28 U/L (ref 2–50)
ANION GAP SERPL CALC-SCNC: 8 MMOL/L (ref 0–11.9)
AST SERPL-CCNC: 36 U/L (ref 12–45)
BILIRUB SERPL-MCNC: 0.7 MG/DL (ref 0.1–1.5)
BUN SERPL-MCNC: 12 MG/DL (ref 8–22)
CALCIUM SERPL-MCNC: 9.3 MG/DL (ref 8.5–10.5)
CHLORIDE SERPL-SCNC: 104 MMOL/L (ref 96–112)
CHOLEST SERPL-MCNC: 194 MG/DL (ref 100–199)
CO2 SERPL-SCNC: 23 MMOL/L (ref 20–33)
CREAT SERPL-MCNC: 0.67 MG/DL (ref 0.5–1.4)
EST. AVERAGE GLUCOSE BLD GHB EST-MCNC: 174 MG/DL
FASTING STATUS PATIENT QL REPORTED: NORMAL
GLOBULIN SER CALC-MCNC: 3.9 G/DL (ref 1.9–3.5)
GLUCOSE SERPL-MCNC: 152 MG/DL (ref 65–99)
HBA1C MFR BLD: 7.7 % (ref 0–5.6)
HDLC SERPL-MCNC: 39 MG/DL
LDLC SERPL CALC-MCNC: 112 MG/DL
POTASSIUM SERPL-SCNC: 4.1 MMOL/L (ref 3.6–5.5)
PROT SERPL-MCNC: 8 G/DL (ref 6–8.2)
SODIUM SERPL-SCNC: 135 MMOL/L (ref 135–145)
TRIGL SERPL-MCNC: 217 MG/DL (ref 0–149)

## 2018-09-10 PROCEDURE — 36415 COLL VENOUS BLD VENIPUNCTURE: CPT

## 2018-09-10 PROCEDURE — 80061 LIPID PANEL: CPT

## 2018-09-10 PROCEDURE — 80053 COMPREHEN METABOLIC PANEL: CPT

## 2018-09-10 PROCEDURE — 83036 HEMOGLOBIN GLYCOSYLATED A1C: CPT

## 2018-09-17 DIAGNOSIS — E11.9 TYPE 2 DIABETES MELLITUS WITHOUT COMPLICATION, WITH LONG-TERM CURRENT USE OF INSULIN (HCC): ICD-10-CM

## 2018-09-17 DIAGNOSIS — E78.2 MIXED HYPERLIPIDEMIA: ICD-10-CM

## 2018-09-17 DIAGNOSIS — I10 ESSENTIAL HYPERTENSION: ICD-10-CM

## 2018-09-17 DIAGNOSIS — Z79.4 TYPE 2 DIABETES MELLITUS WITHOUT COMPLICATION, WITH LONG-TERM CURRENT USE OF INSULIN (HCC): ICD-10-CM

## 2018-09-17 NOTE — TELEPHONE ENCOUNTER
Was the patient seen in the last year in this department? Yes    Does patient have an active prescription for medications requested? No     Received Request Via: Patient    Last OV 3/12/18, last labs 9/10/18

## 2018-09-19 RX ORDER — AMLODIPINE BESYLATE 5 MG/1
5 TABLET ORAL DAILY
Qty: 90 TAB | Refills: 0 | Status: SHIPPED | OUTPATIENT
Start: 2018-09-19 | End: 2018-10-08

## 2018-09-19 RX ORDER — PRAVASTATIN SODIUM 80 MG/1
80 TABLET ORAL DAILY
Qty: 90 TAB | Refills: 0 | Status: SHIPPED | OUTPATIENT
Start: 2018-09-19 | End: 2018-10-08 | Stop reason: SDUPTHER

## 2018-09-19 RX ORDER — ENALAPRIL MALEATE 20 MG/1
40 TABLET ORAL DAILY
Qty: 180 TAB | Refills: 0 | Status: SHIPPED | OUTPATIENT
Start: 2018-09-19 | End: 2018-10-08 | Stop reason: SDUPTHER

## 2018-10-08 ENCOUNTER — OFFICE VISIT (OUTPATIENT)
Dept: MEDICAL GROUP | Facility: PHYSICIAN GROUP | Age: 63
End: 2018-10-08
Payer: COMMERCIAL

## 2018-10-08 VITALS
BODY MASS INDEX: 29.27 KG/M2 | WEIGHT: 155 LBS | OXYGEN SATURATION: 96 % | TEMPERATURE: 98.8 F | HEART RATE: 96 BPM | HEIGHT: 61 IN | SYSTOLIC BLOOD PRESSURE: 148 MMHG | DIASTOLIC BLOOD PRESSURE: 60 MMHG | RESPIRATION RATE: 14 BRPM

## 2018-10-08 DIAGNOSIS — I10 ESSENTIAL HYPERTENSION: ICD-10-CM

## 2018-10-08 DIAGNOSIS — Z79.4 TYPE 2 DIABETES MELLITUS WITHOUT COMPLICATION, WITH LONG-TERM CURRENT USE OF INSULIN (HCC): ICD-10-CM

## 2018-10-08 DIAGNOSIS — Z23 NEED FOR VACCINATION: ICD-10-CM

## 2018-10-08 DIAGNOSIS — R79.89 ELEVATED TSH: ICD-10-CM

## 2018-10-08 DIAGNOSIS — E78.2 MIXED HYPERLIPIDEMIA: ICD-10-CM

## 2018-10-08 DIAGNOSIS — R01.1 NEWLY RECOGNIZED HEART MURMUR: ICD-10-CM

## 2018-10-08 DIAGNOSIS — E11.9 TYPE 2 DIABETES MELLITUS WITHOUT COMPLICATION, WITH LONG-TERM CURRENT USE OF INSULIN (HCC): ICD-10-CM

## 2018-10-08 PROCEDURE — 90471 IMMUNIZATION ADMIN: CPT | Performed by: NURSE PRACTITIONER

## 2018-10-08 PROCEDURE — 90686 IIV4 VACC NO PRSV 0.5 ML IM: CPT | Performed by: NURSE PRACTITIONER

## 2018-10-08 PROCEDURE — 99214 OFFICE O/P EST MOD 30 MIN: CPT | Mod: 25 | Performed by: NURSE PRACTITIONER

## 2018-10-08 RX ORDER — ENALAPRIL MALEATE 20 MG/1
40 TABLET ORAL DAILY
Qty: 180 TAB | Refills: 3 | Status: SHIPPED | OUTPATIENT
Start: 2018-10-08

## 2018-10-08 RX ORDER — AMLODIPINE BESYLATE 10 MG/1
10 TABLET ORAL DAILY
Qty: 90 TAB | Refills: 3 | Status: SHIPPED | OUTPATIENT
Start: 2018-10-08

## 2018-10-08 RX ORDER — PRAVASTATIN SODIUM 80 MG/1
80 TABLET ORAL DAILY
Qty: 90 TAB | Refills: 3 | Status: SHIPPED | OUTPATIENT
Start: 2018-10-08

## 2018-10-08 NOTE — ASSESSMENT & PLAN NOTE
Patient is a 63-year-old female with hypertension and type 2 diabetes.  She is on enalapril 40 mg daily along with amlodipine 5 mg daily.  Pressure has not been at goal and suboptimally managed on present treatment.  She does not monitor her blood pressure at home.  She denies chest pain, shortness of breath, change in vision, headaches.

## 2018-10-08 NOTE — ASSESSMENT & PLAN NOTE
Patient has a history of mild elevation in TSH.  She denies any symptoms associated with hypothyroidism.  We did discuss monitoring this annually and labs have been ordered.

## 2018-10-08 NOTE — ASSESSMENT & PLAN NOTE
Newly recognized murmur was noted on right upper sternal border, she denies exertional fatigue, syncope, chest pain, shortness of breath per

## 2018-10-08 NOTE — ASSESSMENT & PLAN NOTE
Patient is a 63-year-old female with dyslipidemia, type 2 diabetes, hypertension.  She continues on pravastatin 80 mg daily.  No adverse effects.  Lipids are generally well controlled though she does have a slight elevation in triglycerides, we did discuss improved glycemic control and dietary modifications as such she can expect triglycerides to decrease along with fasting glucose and A1c.

## 2018-10-08 NOTE — ASSESSMENT & PLAN NOTE
Patient is a 63-year-old female who is here for follow-up on type 2 diabetes.  Most recent A1c is increased to 7.4%.  She is on 26 units of basic lar nightly, fasting morning glucose readings have been elevated as she has been out of metformin for a few weeks.  She continues on statin, ARB.  ASA recommended.  No hypoglycemic episodes.

## 2018-10-08 NOTE — PROGRESS NOTES
[facility]  Primary Care Office Visit - Problem-Oriented        History:     Magda Poole is a 63 y.o. female who is here today to discuss Follow-Up (for lab results)      Type 2 diabetes mellitus without complication, with long-term current use of insulin (CMS-HCC) (HCC)  Patient is a 63-year-old female who is here for follow-up on type 2 diabetes.  Most recent A1c is increased to 7.4%.  She is on 26 units of basic lar nightly, fasting morning glucose readings have been elevated as she has been out of metformin for a few weeks.  She continues on statin, ARB.  ASA recommended.  No hypoglycemic episodes.          Essential hypertension  Patient is a 63-year-old female with hypertension and type 2 diabetes.  She is on enalapril 40 mg daily along with amlodipine 5 mg daily.  Pressure has not been at goal and suboptimally managed on present treatment.  She does not monitor her blood pressure at home.  She denies chest pain, shortness of breath, change in vision, headaches.    Elevated TSH  Patient has a history of mild elevation in TSH.  She denies any symptoms associated with hypothyroidism.  We did discuss monitoring this annually and labs have been ordered.    Mixed hyperlipidemia  Patient is a 63-year-old female with dyslipidemia, type 2 diabetes, hypertension.  She continues on pravastatin 80 mg daily.  No adverse effects.  Lipids are generally well controlled though she does have a slight elevation in triglycerides, we did discuss improved glycemic control and dietary modifications as such she can expect triglycerides to decrease along with fasting glucose and A1c.    Newly recognized heart murmur  Newly recognized murmur was noted on right upper sternal border, she denies exertional fatigue, syncope, chest pain, shortness of breath per        Past Medical History:   Diagnosis Date   • Diabetes (HCC)    • Hyperlipidemia    • Hypertension      Past Surgical History:   Procedure Laterality Date   •  ABDOMINAL HYSTERECTOMY TOTAL  2004    benign causes      Social History     Social History   • Marital status: Unknown     Spouse name: N/A   • Number of children: N/A   • Years of education: N/A     Occupational History   • Not on file.     Social History Main Topics   • Smoking status: Never Smoker   • Smokeless tobacco: Never Used   • Alcohol use No   • Drug use: No   • Sexual activity: Yes     Partners: Male      Comment:       Other Topics Concern   • Not on file     Social History Narrative   • No narrative on file     History   Smoking Status   • Never Smoker   Smokeless Tobacco   • Never Used     Family History   Problem Relation Age of Onset   • Diabetes Mother    • Heart Disease Father 80     Allergies   Allergen Reactions   • Glipizide Itching       Problem List:     Patient Active Problem List    Diagnosis Date Noted   • Newly recognized heart murmur 10/08/2018   • Health care maintenance 12/11/2017   • Elevated TSH 12/11/2017   • Obesity (BMI 30-39.9) 11/13/2017   • Type 2 diabetes mellitus without complication, with long-term current use of insulin (HCC) 11/13/2017   • Essential hypertension 11/13/2017   • Mixed hyperlipidemia 11/13/2017         Medications:     Current Outpatient Prescriptions:   •  SITagliptin (JANUVIA) 25 MG Tab, Take 1 Tab by mouth every day., Disp: 90 Tab, Rfl: 1  •  metformin (GLUCOPHAGE) 1000 MG tablet, Take 1 Tab by mouth 2 times a day, with meals., Disp: 180 Tab, Rfl: 3  •  enalapril (VASOTEC) 20 MG tablet, Take 2 Tabs by mouth every day., Disp: 180 Tab, Rfl: 3  •  amLODIPine (NORVASC) 10 MG Tab, Take 1 Tab by mouth every day., Disp: 90 Tab, Rfl: 3  •  pravastatin (PRAVACHOL) 80 MG tablet, Take 1 Tab by mouth every day., Disp: 90 Tab, Rfl: 3  •  Insulin Glargine (BASAGLAR KWIKPEN) 100 UNIT/ML Solution Pen-injector, Inject 20 Units as instructed every bedtime. 20 units HS, increase nightly by 1 unit until fasting morning glucose is 130, Disp: 5 PEN, Rfl: 5  •  INSULIN  "SYRINGE .5CC/29G (RELION INSULIN SYR 0.5CC/29G) 29G X 1/2\" 0.5 ML Misc, Use to administer lantus insulin nightly, Disp: 30 Each, Rfl: 5      Review of Systems:     Pertinent positives as per HPI, all other systems reviewed and WNL     Physical Assessment:     VS: /60 (BP Location: Right arm, Patient Position: Sitting, BP Cuff Size: Adult)   Pulse 96   Temp 37.1 °C (98.8 °F) (Temporal)   Resp 14   Ht 1.549 m (5' 1\")   Wt 70.3 kg (155 lb)   SpO2 96%   BMI 29.29 kg/m²     General: Well-developed, well-nourished, female     Head: PERRL, EOMI. Normocephalic. No facial asymmetry noted.  Cardiovasc: RRR, systolic murmur noted at right upper sternal border.  No thrills or bruits. Pulses 2+ and symmetric at all distal extremities.  Pulmonary: Lungs clear bilaterally.  Normal respiratory effort. No wheeze or crackles.   Neuro: Alert and oriented, CNs II-XII intact, no focal deficits.  Skin:No rashes noted. Skin warm, dry, intact    Psych: Dressed appropriately for the weather, pleasant and conversant.  Affect, mood & judgment appropriate.    Assessment/Plan:   Magda was seen today for follow-up.    Diagnoses and all orders for this visit:    Type 2 diabetes mellitus without complication, with long-term current use of insulin (HCC), chronic, uncontrolled   -Continue metformin, basic alert nightly, start Januvia 25 mg daily with meals.  Discussed dietary modifications and exercise.  Continue statin, ARB.  Recommend ASA.  Repeat labs and follow-up in 3 months.  -     HEMOGLOBIN A1C; Future  -     SITagliptin (JANUVIA) 25 MG Tab; Take 1 Tab by mouth every day.  -     metformin (GLUCOPHAGE) 1000 MG tablet; Take 1 Tab by mouth 2 times a day, with meals.    Essential hypertension, chronic, uncontrolled  -Continue enalapril, maximize amlodipine.  Monitor labs and follow-up in 3 months.  -     BASIC METABOLIC PANEL; Future  -     enalapril (VASOTEC) 20 MG tablet; Take 2 Tabs by mouth every day.  -     amLODIPine (NORVASC) " 10 MG Tab; Take 1 Tab by mouth every day.    Mixed hyperlipidemia, chronic, stable  -Continue current treatment, discussed dietary modifications as they pertain to triglycerides, follow-up in 3 months.  Monitor labs in 6 months.   -     pravastatin (PRAVACHOL) 80 MG tablet; Take 1 Tab by mouth every day.    Elevated TSH, unclear control, check labs  -     TSH WITH REFLEX TO FT4; Future    Need for vaccination  -     Flu Quad Inj >3 Year Pre-Filled PF    Newly recognized heart murmur, new, suspect aortic stenosis given right upper sternal border murmur, evaluate with echocardiogram.  -     EC-ECHOCARDIOGRAM COMPLETE W/O CONT; Future      Patient is agreeable to the above plan and voiced understanding. All questions answered.     Please note that this dictation was created using voice recognition software. I have made every reasonable attempt to correct obvious errors, but I expect that there are errors of grammar and possibly content that I did not discover before finalizing the note.      MELIA Solano  10/8/2018, 10:37 AM

## 2018-12-31 ENCOUNTER — HOSPITAL ENCOUNTER (OUTPATIENT)
Dept: LAB | Facility: MEDICAL CENTER | Age: 63
End: 2018-12-31
Attending: NURSE PRACTITIONER
Payer: COMMERCIAL

## 2018-12-31 DIAGNOSIS — E11.9 TYPE 2 DIABETES MELLITUS WITHOUT COMPLICATION, WITH LONG-TERM CURRENT USE OF INSULIN (HCC): ICD-10-CM

## 2018-12-31 DIAGNOSIS — I10 ESSENTIAL HYPERTENSION: ICD-10-CM

## 2018-12-31 DIAGNOSIS — Z79.4 TYPE 2 DIABETES MELLITUS WITHOUT COMPLICATION, WITH LONG-TERM CURRENT USE OF INSULIN (HCC): ICD-10-CM

## 2018-12-31 DIAGNOSIS — R79.89 ELEVATED TSH: ICD-10-CM

## 2018-12-31 LAB
ANION GAP SERPL CALC-SCNC: 10 MMOL/L (ref 0–11.9)
BUN SERPL-MCNC: 8 MG/DL (ref 8–22)
CALCIUM SERPL-MCNC: 9.3 MG/DL (ref 8.5–10.5)
CHLORIDE SERPL-SCNC: 105 MMOL/L (ref 96–112)
CO2 SERPL-SCNC: 24 MMOL/L (ref 20–33)
CREAT SERPL-MCNC: 0.68 MG/DL (ref 0.5–1.4)
EST. AVERAGE GLUCOSE BLD GHB EST-MCNC: 169 MG/DL
FASTING STATUS PATIENT QL REPORTED: NORMAL
GLUCOSE SERPL-MCNC: 140 MG/DL (ref 65–99)
HBA1C MFR BLD: 7.5 % (ref 0–5.6)
POTASSIUM SERPL-SCNC: 4.1 MMOL/L (ref 3.6–5.5)
SODIUM SERPL-SCNC: 139 MMOL/L (ref 135–145)
TSH SERPL DL<=0.005 MIU/L-ACNC: 4.57 UIU/ML (ref 0.38–5.33)

## 2018-12-31 PROCEDURE — 84443 ASSAY THYROID STIM HORMONE: CPT

## 2018-12-31 PROCEDURE — 36415 COLL VENOUS BLD VENIPUNCTURE: CPT

## 2018-12-31 PROCEDURE — 80048 BASIC METABOLIC PNL TOTAL CA: CPT

## 2018-12-31 PROCEDURE — 83036 HEMOGLOBIN GLYCOSYLATED A1C: CPT

## 2019-01-08 ENCOUNTER — OFFICE VISIT (OUTPATIENT)
Dept: MEDICAL GROUP | Facility: PHYSICIAN GROUP | Age: 64
End: 2019-01-08
Payer: COMMERCIAL

## 2019-01-08 VITALS
SYSTOLIC BLOOD PRESSURE: 142 MMHG | TEMPERATURE: 98.3 F | HEIGHT: 61 IN | HEART RATE: 93 BPM | WEIGHT: 155.4 LBS | BODY MASS INDEX: 29.34 KG/M2 | OXYGEN SATURATION: 96 % | RESPIRATION RATE: 12 BRPM | DIASTOLIC BLOOD PRESSURE: 64 MMHG

## 2019-01-08 DIAGNOSIS — E11.9 TYPE 2 DIABETES MELLITUS WITHOUT COMPLICATION, WITH LONG-TERM CURRENT USE OF INSULIN (HCC): ICD-10-CM

## 2019-01-08 DIAGNOSIS — E78.2 MIXED HYPERLIPIDEMIA: ICD-10-CM

## 2019-01-08 DIAGNOSIS — E66.9 OBESITY (BMI 30-39.9): ICD-10-CM

## 2019-01-08 DIAGNOSIS — Z79.4 TYPE 2 DIABETES MELLITUS WITHOUT COMPLICATION, WITH LONG-TERM CURRENT USE OF INSULIN (HCC): ICD-10-CM

## 2019-01-08 DIAGNOSIS — I10 ESSENTIAL HYPERTENSION: ICD-10-CM

## 2019-01-08 PROCEDURE — 99214 OFFICE O/P EST MOD 30 MIN: CPT | Performed by: NURSE PRACTITIONER

## 2019-01-08 RX ORDER — HYDROCHLOROTHIAZIDE 12.5 MG/1
12.5 CAPSULE, GELATIN COATED ORAL DAILY
Qty: 90 CAP | Refills: 1 | Status: SHIPPED | OUTPATIENT
Start: 2019-01-08

## 2019-01-08 ASSESSMENT — PATIENT HEALTH QUESTIONNAIRE - PHQ9: CLINICAL INTERPRETATION OF PHQ2 SCORE: 0

## 2019-01-08 NOTE — PROGRESS NOTES
[facility]  Primary Care Office Visit - Problem-Oriented        History:     Magda Poole is a 63 y.o. female who is here today to discuss Diabetes ()      Type 2 diabetes mellitus without complication, with long-term current use of insulin (CMS-[facility]) (HCC)  Patient is a 63-year-old female with type 2 diabetes here for follow-up.  The last office visit she was started on sitagliptin 25 mg once daily, unfortunately A1c has not significantly improved, remains elevated at 7.5%.  She is compliant with other medications.  She continues on long-acting insulin nightly 25 units.  Fasting morning glucose readings are generally 120-130.  No hypoglycemic episodes.  She is also on ARB, statin, ASA.    Essential hypertension  As noted, patient is a 63-year-old female with type 2 diabetes and hypertension.  Blood pressure remains not at goal.  She continues on enalapril 40 mg daily, amlodipine 10 mg daily.  She denies chest pain, shortness of breath, change of vision, headaches.    Mixed hyperlipidemia  This is a chronic condition which is well controlled on medications. Patient is tolerating medications without side effects.      Obesity (BMI 30-39.9)  Weight is stable at 155, no deliberate exercise or dietary modifications.        Past Medical History:   Diagnosis Date   • Diabetes (HCC)    • Hyperlipidemia    • Hypertension      Past Surgical History:   Procedure Laterality Date   • ABDOMINAL HYSTERECTOMY TOTAL  2004    benign causes      Social History     Social History   • Marital status: Unknown     Spouse name: N/A   • Number of children: N/A   • Years of education: N/A     Occupational History   • Not on file.     Social History Main Topics   • Smoking status: Never Smoker   • Smokeless tobacco: Never Used   • Alcohol use No   • Drug use: No   • Sexual activity: Yes     Partners: Male      Comment:       Other Topics Concern   • Not on file     Social History Narrative   • No narrative on file  "    History   Smoking Status   • Never Smoker   Smokeless Tobacco   • Never Used     Family History   Problem Relation Age of Onset   • Diabetes Mother    • Heart Disease Father 80     Allergies   Allergen Reactions   • Glipizide Itching       Problem List:     Patient Active Problem List    Diagnosis Date Noted   • Newly recognized heart murmur 10/08/2018   • Health care maintenance 12/11/2017   • Elevated TSH 12/11/2017   • Obesity (BMI 30-39.9) 11/13/2017   • Type 2 diabetes mellitus without complication, with long-term current use of insulin (HCC) 11/13/2017   • Essential hypertension 11/13/2017   • Mixed hyperlipidemia 11/13/2017         Medications:     Current Outpatient Prescriptions:   •  hydrochlorothiazide (MICROZIDE) 12.5 MG capsule, Take 1 Cap by mouth every day., Disp: 90 Cap, Rfl: 1  •  SITagliptin (JANUVIA) 100 MG Tab, Take 1 Tab by mouth every day., Disp: 90 Tab, Rfl: 1  •  metformin (GLUCOPHAGE) 1000 MG tablet, Take 1 Tab by mouth 2 times a day, with meals., Disp: 180 Tab, Rfl: 3  •  enalapril (VASOTEC) 20 MG tablet, Take 2 Tabs by mouth every day., Disp: 180 Tab, Rfl: 3  •  amLODIPine (NORVASC) 10 MG Tab, Take 1 Tab by mouth every day., Disp: 90 Tab, Rfl: 3  •  pravastatin (PRAVACHOL) 80 MG tablet, Take 1 Tab by mouth every day., Disp: 90 Tab, Rfl: 3  •  Insulin Glargine (BASAGLAR KWIKPEN) 100 UNIT/ML Solution Pen-injector, Inject 20 Units as instructed every bedtime. 20 units HS, increase nightly by 1 unit until fasting morning glucose is 130, Disp: 5 PEN, Rfl: 5  •  INSULIN SYRINGE .5CC/29G (RELION INSULIN SYR 0.5CC/29G) 29G X 1/2\" 0.5 ML Misc, Use to administer lantus insulin nightly, Disp: 30 Each, Rfl: 5      Review of Systems:     Pertinent positives as per HPI, all other systems reviewed and WNL     Physical Assessment:     VS: /64 (BP Location: Left arm, Patient Position: Sitting, BP Cuff Size: Adult)   Pulse 93   Temp 36.8 °C (98.3 °F)   Resp 12   Ht 1.549 m (5' 1\")   Wt 70.5 " kg (155 lb 6.4 oz)   SpO2 96%   Breastfeeding? No   BMI 29.36 kg/m²     General: Well-developed, well-nourished, female    Head: PERRL, EOMI. Normocephalic. No facial asymmetry noted.  Cardiovasc: RRR, no MRG. No thrills or bruits. Pulses 2+ and symmetric at all distal extremities.  Pulmonary: Lungs clear bilaterally.  Normal respiratory effort. No wheeze or crackles.   Neuro: Alert and oriented, CNs II-XII intact, no focal deficits.  Skin:No rashes noted. Skin warm, dry, intact    Psych: Dressed appropriately for the weather, pleasant and conversant.  Affect, mood & judgment appropriate.    Assessment/Plan:   Magda was seen today for diabetes.    Diagnoses and all orders for this visit:    Type 2 diabetes mellitus without complication, with long-term current use of insulin (HCC), uncontrolled   - increase januvia to 100mg daily, no other changes today. Monitor fasting and random glucose readings. Discuss diet and exercise. Follow up in 3 months with labs.   -     SITagliptin (JANUVIA) 100 MG Tab; Take 1 Tab by mouth every day.  -     Lipid Profile; Future  -     HEMOGLOBIN A1C; Future  -     BASIC METABOLIC PANEL; Future    Essential hypertension, chronic, uncontrolled   - add diuretic to ARB & CCB, monitor at home, follow up in 3 months, sooner if BP remains uncontrolled.   -     hydrochlorothiazide (MICROZIDE) 12.5 MG capsule; Take 1 Cap by mouth every day.  -     BASIC METABOLIC PANEL; Future    Mixed hyperlipidemia, chronic, stable on present treatment   -     Lipid Profile; Future    Obesity (BMI 30-39.9)  - diet and exercise       Patient is agreeable to the above plan and voiced understanding. All questions answered.     Please note that this dictation was created using voice recognition software. I have made every reasonable attempt to correct obvious errors, but I expect that there are errors of grammar and possibly content that I did not discover before finalizing the note.      Montse Easton,  FNP-[facility]  1/8/2019, 8:35 AM

## 2019-01-08 NOTE — ASSESSMENT & PLAN NOTE
As noted, patient is a 63-year-old female with type 2 diabetes and hypertension.  Blood pressure remains not at goal.  She continues on enalapril 40 mg daily, amlodipine 10 mg daily.  She denies chest pain, shortness of breath, change of vision, headaches.

## 2019-01-08 NOTE — ASSESSMENT & PLAN NOTE
Patient is a 63-year-old female with type 2 diabetes here for follow-up.  The last office visit she was started on sitagliptin 25 mg once daily, unfortunately A1c has not significantly improved, remains elevated at 7.5%.  She is compliant with other medications.  She continues on long-acting insulin nightly 25 units.  Fasting morning glucose readings are generally 120-130.  No hypoglycemic episodes.  She is also on ARB, statin, ASA.

## 2019-04-01 ENCOUNTER — HOSPITAL ENCOUNTER (OUTPATIENT)
Dept: LAB | Facility: MEDICAL CENTER | Age: 64
End: 2019-04-01
Attending: NURSE PRACTITIONER
Payer: COMMERCIAL

## 2019-04-01 DIAGNOSIS — I10 ESSENTIAL HYPERTENSION: ICD-10-CM

## 2019-04-01 DIAGNOSIS — Z79.4 TYPE 2 DIABETES MELLITUS WITHOUT COMPLICATION, WITH LONG-TERM CURRENT USE OF INSULIN (HCC): ICD-10-CM

## 2019-04-01 DIAGNOSIS — E78.2 MIXED HYPERLIPIDEMIA: ICD-10-CM

## 2019-04-01 DIAGNOSIS — E11.9 TYPE 2 DIABETES MELLITUS WITHOUT COMPLICATION, WITH LONG-TERM CURRENT USE OF INSULIN (HCC): ICD-10-CM

## 2019-04-01 LAB
ANION GAP SERPL CALC-SCNC: 12 MMOL/L (ref 0–11.9)
BUN SERPL-MCNC: 8 MG/DL (ref 8–22)
CALCIUM SERPL-MCNC: 9 MG/DL (ref 8.5–10.5)
CHLORIDE SERPL-SCNC: 103 MMOL/L (ref 96–112)
CHOLEST SERPL-MCNC: 170 MG/DL (ref 100–199)
CO2 SERPL-SCNC: 22 MMOL/L (ref 20–33)
CREAT SERPL-MCNC: 0.72 MG/DL (ref 0.5–1.4)
EST. AVERAGE GLUCOSE BLD GHB EST-MCNC: 160 MG/DL
GLUCOSE SERPL-MCNC: 157 MG/DL (ref 65–99)
HBA1C MFR BLD: 7.2 % (ref 0–5.6)
HDLC SERPL-MCNC: 38 MG/DL
LDLC SERPL CALC-MCNC: 107 MG/DL
POTASSIUM SERPL-SCNC: 4.3 MMOL/L (ref 3.6–5.5)
SODIUM SERPL-SCNC: 137 MMOL/L (ref 135–145)
TRIGL SERPL-MCNC: 123 MG/DL (ref 0–149)

## 2019-04-01 PROCEDURE — 36415 COLL VENOUS BLD VENIPUNCTURE: CPT

## 2019-04-01 PROCEDURE — 83036 HEMOGLOBIN GLYCOSYLATED A1C: CPT

## 2019-04-01 PROCEDURE — 80048 BASIC METABOLIC PNL TOTAL CA: CPT

## 2019-04-01 PROCEDURE — 80061 LIPID PANEL: CPT

## 2019-04-09 ENCOUNTER — OFFICE VISIT (OUTPATIENT)
Dept: MEDICAL GROUP | Facility: PHYSICIAN GROUP | Age: 64
End: 2019-04-09
Payer: COMMERCIAL

## 2019-04-09 VITALS
TEMPERATURE: 98 F | SYSTOLIC BLOOD PRESSURE: 124 MMHG | WEIGHT: 152 LBS | BODY MASS INDEX: 28.7 KG/M2 | RESPIRATION RATE: 14 BRPM | OXYGEN SATURATION: 98 % | DIASTOLIC BLOOD PRESSURE: 50 MMHG | HEIGHT: 61 IN | HEART RATE: 101 BPM

## 2019-04-09 DIAGNOSIS — R01.1 NEWLY RECOGNIZED HEART MURMUR: ICD-10-CM

## 2019-04-09 DIAGNOSIS — Z79.4 TYPE 2 DIABETES MELLITUS WITHOUT COMPLICATION, WITH LONG-TERM CURRENT USE OF INSULIN (HCC): ICD-10-CM

## 2019-04-09 DIAGNOSIS — E11.9 TYPE 2 DIABETES MELLITUS WITHOUT COMPLICATION, WITH LONG-TERM CURRENT USE OF INSULIN (HCC): ICD-10-CM

## 2019-04-09 DIAGNOSIS — I10 ESSENTIAL HYPERTENSION: Primary | ICD-10-CM

## 2019-04-09 DIAGNOSIS — E78.2 MIXED HYPERLIPIDEMIA: ICD-10-CM

## 2019-04-09 PROCEDURE — 99214 OFFICE O/P EST MOD 30 MIN: CPT | Performed by: NURSE PRACTITIONER

## 2019-04-09 RX ORDER — INSULIN GLARGINE 100 [IU]/ML
22 INJECTION, SOLUTION SUBCUTANEOUS
Qty: 5 PEN | Refills: 5 | Status: SHIPPED | OUTPATIENT
Start: 2019-04-09

## 2019-04-09 NOTE — ASSESSMENT & PLAN NOTE
Patient's blood pressure is much improved with the addition of hydrochlorothiazide to her enalapril and amlodipine.  No chest pain, shortness of breath, change of vision, headaches.

## 2019-04-09 NOTE — ASSESSMENT & PLAN NOTE
She was noted to have systolic murmur at the left upper sternal border, echocardiogram was recommended though she has not yet scheduled this appointment.  She has not had any syncope, palpitations, fatigue.

## 2019-04-09 NOTE — PROGRESS NOTES
[facility]  Primary Care Office Visit - Problem-Oriented        History:     Magda Poole is a 63 y.o. female who is here today to discuss Diabetes ()      Type 2 diabetes mellitus without complication, with long-term current use of insulin (CMS-[facility]) (HCC)  Patient is a 63-year-old female with type 2 diabetes here for follow-up.  She continues on metformin 1000 mg twice daily, sitagliptin 100 mg daily along with nightly basiglar 22 units.  Fasting morning glucose readings are generally 130-135.  She sporadically checks random glucose readings.  A1c is 7.2%.  She continues on ARB as well as statin. No hypoglycemic episodes.     Essential hypertension  Patient's blood pressure is much improved with the addition of hydrochlorothiazide to her enalapril and amlodipine.  No chest pain, shortness of breath, change of vision, headaches.     Mixed hyperlipidemia  This is a chronic condition which is well controlled on medications. Patient is tolerating medications without side effects.      Newly recognized heart murmur  She was noted to have systolic murmur at the left upper sternal border, echocardiogram was recommended though she has not yet scheduled this appointment.  She has not had any syncope, palpitations, fatigue.        Past Medical History:   Diagnosis Date   • Diabetes (HCC)    • Hyperlipidemia    • Hypertension      Past Surgical History:   Procedure Laterality Date   • ABDOMINAL HYSTERECTOMY TOTAL  2004    benign causes      Social History     Social History   • Marital status: Unknown     Spouse name: N/A   • Number of children: N/A   • Years of education: N/A     Occupational History   • Not on file.     Social History Main Topics   • Smoking status: Never Smoker   • Smokeless tobacco: Never Used   • Alcohol use No   • Drug use: No   • Sexual activity: Yes     Partners: Male      Comment:       Other Topics Concern   • Not on file     Social History Narrative   • No narrative on  "file     History   Smoking Status   • Never Smoker   Smokeless Tobacco   • Never Used     Family History   Problem Relation Age of Onset   • Diabetes Mother    • Heart Disease Father 80     Allergies   Allergen Reactions   • Glipizide Itching       Problem List:     Patient Active Problem List    Diagnosis Date Noted   • Newly recognized heart murmur 10/08/2018   • Health care maintenance 12/11/2017   • Elevated TSH 12/11/2017   • Obesity (BMI 30-39.9) 11/13/2017   • Type 2 diabetes mellitus without complication, with long-term current use of insulin (HCC) 11/13/2017   • Essential hypertension 11/13/2017   • Mixed hyperlipidemia 11/13/2017         Medications:     Current Outpatient Prescriptions:   •  Insulin Glargine (BASAGLAR KWIKPEN) 100 UNIT/ML Solution Pen-injector, Inject 22 Units as instructed every bedtime. 20 units HS, increase nightly by 1 unit until fasting morning glucose is 130, Disp: 5 PEN, Rfl: 5  •  hydrochlorothiazide (MICROZIDE) 12.5 MG capsule, Take 1 Cap by mouth every day., Disp: 90 Cap, Rfl: 1  •  SITagliptin (JANUVIA) 100 MG Tab, Take 1 Tab by mouth every day., Disp: 90 Tab, Rfl: 1  •  metformin (GLUCOPHAGE) 1000 MG tablet, Take 1 Tab by mouth 2 times a day, with meals., Disp: 180 Tab, Rfl: 3  •  enalapril (VASOTEC) 20 MG tablet, Take 2 Tabs by mouth every day., Disp: 180 Tab, Rfl: 3  •  amLODIPine (NORVASC) 10 MG Tab, Take 1 Tab by mouth every day., Disp: 90 Tab, Rfl: 3  •  pravastatin (PRAVACHOL) 80 MG tablet, Take 1 Tab by mouth every day., Disp: 90 Tab, Rfl: 3  •  INSULIN SYRINGE .5CC/29G (RELION INSULIN SYR 0.5CC/29G) 29G X 1/2\" 0.5 ML Misc, Use to administer lantus insulin nightly, Disp: 30 Each, Rfl: 5      Review of Systems:     Pertinent positives as per HPI, all other systems reviewed and WNL     Physical Assessment:     VS: /50 (BP Location: Right arm, Patient Position: Sitting, BP Cuff Size: Adult)   Pulse (!) 101   Temp 36.7 °C (98 °F)   Resp 14   Ht 1.549 m (5' 1\")   " Wt 68.9 kg (152 lb)   SpO2 98%   BMI 28.72 kg/m²     General: Well-developed, well-nourished, female     Head: PERRL, EOMI. Normocephalic. No facial asymmetry noted.  Cardiovasc: RRR, no MRG. No thrills or bruits. Pulses 2+ and symmetric at all distal extremities.  Pulmonary: Lungs clear bilaterally.  Normal respiratory effort. No wheeze or crackles.   Extremities: No edema  Neuro: Alert and oriented, CNs II-XII intact, no focal deficits.  Skin:No rashes noted. Skin warm, dry, intact    Psych: Dressed appropriately for the weather, pleasant and conversant.  Affect, mood & judgment appropriate.    Assessment/Plan:   Magda was seen today for diabetes.    Diagnoses and all orders for this visit:    Essential hypertension, chronic, controlled with present treatment, monitor     Type 2 diabetes mellitus without complication, with long-term current use of insulin ([facility]), chronic, stable   - discussed A1c goal and treatments, she will continue on current treatments and follow up in 3 months with labs.  -     MICROALBUMIN CREAT RATIO URINE; Future  -     Basic Metabolic Panel; Future  -     HEMOGLOBIN A1C; Future  -     Insulin Glargine (BASAGLAR KWIKPEN) 100 UNIT/ML Solution Pen-injector; Inject 22 Units as instructed every bedtime. 20 units HS, increase nightly by 1 unit until fasting morning glucose is 130    Mixed hyperlipidemia, chronic, controlled on statin, monitor     Newly recognized heart murmur  - recommend ECHO, previously ordered       Patient is agreeable to the above plan and voiced understanding. All questions answered.     Please note that this dictation was created using voice recognition software. I have made every reasonable attempt to correct obvious errors, but I expect that there are errors of grammar and possibly content that I did not discover before finalizing the note.      MELIA Solano  4/9/2019, 9:35 AM

## 2025-06-20 NOTE — ASSESSMENT & PLAN NOTE
Patient is a 62-year-old female with type 2 diabetes and hypertension. She continues on enalapril 40 mg daily and amlodipine 5 mg daily. She denies chest pain, shortness of breath continued vision, headaches. Her blood pressure is borderline today.   Tan-brown symmetric macules and papules